# Patient Record
Sex: MALE | Race: BLACK OR AFRICAN AMERICAN | NOT HISPANIC OR LATINO | URBAN - METROPOLITAN AREA
[De-identification: names, ages, dates, MRNs, and addresses within clinical notes are randomized per-mention and may not be internally consistent; named-entity substitution may affect disease eponyms.]

---
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
## 2019-08-20 ENCOUNTER — TRANSFERRED RECORDS (OUTPATIENT)
Dept: FAMILY MEDICINE | Facility: CLINIC | Age: 19
End: 2019-08-20

## 2019-08-22 ENCOUNTER — OFFICE VISIT (OUTPATIENT)
Dept: FAMILY MEDICINE | Facility: CLINIC | Age: 19
End: 2019-08-22

## 2019-08-22 VITALS
WEIGHT: 151 LBS | RESPIRATION RATE: 16 BRPM | HEART RATE: 69 BPM | HEIGHT: 71 IN | BODY MASS INDEX: 21.14 KG/M2 | DIASTOLIC BLOOD PRESSURE: 76 MMHG | SYSTOLIC BLOOD PRESSURE: 118 MMHG | OXYGEN SATURATION: 99 %

## 2019-08-22 DIAGNOSIS — Z00.00 ROUTINE GENERAL MEDICAL EXAMINATION AT A HEALTH CARE FACILITY: Primary | ICD-10-CM

## 2019-08-22 DIAGNOSIS — Z13.6 SCREENING FOR CARDIOVASCULAR CONDITION: ICD-10-CM

## 2019-08-22 DIAGNOSIS — Z23 NEED FOR MENINGITIS VACCINATION: ICD-10-CM

## 2019-08-22 PROCEDURE — 99395 PREV VISIT EST AGE 18-39: CPT | Mod: 25 | Performed by: FAMILY MEDICINE

## 2019-08-22 PROCEDURE — 36415 COLL VENOUS BLD VENIPUNCTURE: CPT | Performed by: FAMILY MEDICINE

## 2019-08-22 PROCEDURE — 90620 MENB-4C VACCINE IM: CPT | Performed by: FAMILY MEDICINE

## 2019-08-22 PROCEDURE — 80061 LIPID PANEL: CPT | Mod: 90 | Performed by: FAMILY MEDICINE

## 2019-08-22 PROCEDURE — 90471 IMMUNIZATION ADMIN: CPT | Performed by: FAMILY MEDICINE

## 2019-08-22 PROCEDURE — 80053 COMPREHEN METABOLIC PANEL: CPT | Mod: 90 | Performed by: FAMILY MEDICINE

## 2019-08-22 ASSESSMENT — MIFFLIN-ST. JEOR: SCORE: 1717.06

## 2019-08-22 NOTE — LETTER
[facility]  [address] [place]  [address]  Phone: [phone]    August 23, 2019      Ashkan Alberts  [address]  [facility] [address]              Dear Ashkan,    The results from your recent visit showed Total and LDL cholesterol are fine.   HDL cholesterol is too low. The only thing that is known to make this better is exercise, which you are already doing.   Normal glucose, electrolytes, kidney function, and liver function.         Sincerely,     Deepa Young M.D.    Results for orders placed or performed in visit on 08/22/19   Lipid Panel ([facility])   Result Value Ref Range    Cholesterol 150 100 - 199 mg/dL    Triglycerides 56 0 - 149 mg/dL    HDL Cholesterol 39 (L) >39 mg/dL    VLDL Cholesterol Drew 11 5 - 40 mg/dL    LDL Cholesterol Calculated 100 (H) 0 - 99 mg/dL    LDL/HDL Ratio 2.6 0.0 - 3.6 ratio    Narrative    Performed at:  01 - [facility]  [employer] [place] [address]  [id number]  : Morgan Cevallos MD, Phone:  [phone]   Comp. Metabolic Panel (14) ([facility])   Result Value Ref Range    Glucose 89 65 - 99 mg/dL    Urea Nitrogen 15 6 - 20 mg/dL    Creatinine 1.04 0.76 - 1.27 mg/dL    eGFR If NonAfricn Am 103 >59 mL/min/1.73    eGFR If Africn Am 119 >59 mL/min/1.73    BUN/Creatinine Ratio 14 9 - 20    Sodium 140 134 - 144 mmol/L    Potassium 4.8 3.5 - 5.2 mmol/L    Chloride 102 96 - 106 mmol/L    Total CO2 25 20 - 29 mmol/L    Calcium 9.8 8.7 - 10.2 mg/dL    Protein Total 7.1 6.0 - 8.5 g/dL    Albumin 4.7 3.5 - 5.5 g/dL    Globulin, Total 2.4 1.5 - 4.5 g/dL    A/G Ratio 2.0 1.2 - 2.2    Bilirubin Total 0.6 0.0 - 1.2 mg/dL    Alkaline Phosphatase 79 39 - 117 IU/L    AST 15 0 - 40 IU/L    ALT 10 0 - 44 IU/L    Narrative    Performed at:  01 - [facility] [address], [place], [place]  [id number]  : Morgan Cevallos MD, Phone:  [phone]

## 2019-08-22 NOTE — PROGRESS NOTES
SUBJECTIVE:   CC: Ashkan Alberts is an 19 year old male who presents for preventive health visit.   Needs sports Px.  He takes a protein supplement for trying to add weight.  He plays Basketball for college.    Healthy Habits:    Do you get at least three servings of calcium containing foods daily (dairy, green leafy vegetables, etc.)? yes    Amount of exercise or daily activities, outside of work: 7 day(s) per week    Problems taking medications regularly No    Medication side effects: No    Have you had an eye exam in the past two years? yes    Do you see a dentist twice per year? yes    Do you have sleep apnea, excessive snoring or daytime drowsiness?no    Today's PHQ-2 Score:   PHQ-2 ( 1999 [employer]) 8/22/2019 7/25/2018   Q1: Little interest or pleasure in doing things 0 0   Q2: Feeling down, depressed or hopeless 0 0   PHQ-2 Score 0 0       Abuse: Current or Past(Physical, Sexual or Emotional)- No  Do you feel safe in your environment? Yes    Social History     Tobacco Use     Smoking status: Never Smoker     Smokeless tobacco: Never Used   Substance Use Topics     Alcohol use: No     If you drink alcohol do you typically have >3 drinks per day or >7 drinks per week? No                      Last PSA: No results found for: PSA    Reviewed orders with patient. Reviewed health maintenance and updated orders accordingly - Yes  Lab work is in process  Labs reviewed in EPIC  BP Readings from Last 3 Encounters:   08/22/19 118/76   07/25/18 98/62   08/28/17 102/70    Wt Readings from Last 3 Encounters:   08/22/19 68.5 kg (151 lb)   07/25/18 68 kg (150 lb) (45 %)*   08/28/17 68.9 kg (152 lb) (54 %)*     * Growth percentiles are based on CDC (Boys, 2-20 Years) data.                  Patient Active Problem List   Diagnosis     Attention deficit hyperactivity disorder (ADHD), predominantly inattentive type     Past Surgical History:   Procedure Laterality Date     PE TUBES Bilateral      wisdom teeth         Social  "History     Tobacco Use     Smoking status: Never Smoker     Smokeless tobacco: Never Used   Substance Use Topics     Alcohol use: No     Family History   Problem Relation Age of Onset     Hypertension Father      Hyperlipidemia Father      Cardiomyopathy Paternal Uncle 30         No current outpatient medications on file.     Allergies   Allergen Reactions     Floxin [Ofloxacin]      Recent Labs   Lab Test 06/08/17   LDL 93   HDL 50   TRIG 107        Reviewed and updated as needed this visit by clinical staff  Tobacco  Allergies  Meds  Surg Hx  Soc Hx        Reviewed and updated as needed this visit by Provider  Tobacco  Surg Hx  Soc Hx       Past Medical History:   Diagnosis Date     Hx of varicella     per [facility] - varicella vaccine not given - stated as unneccessary - verify w/pt next visit     Hx of varicella     per [facility] - varicella vaccine not give - stated unnecessary - verify w/pt next visit      Past Surgical History:   Procedure Laterality Date     PE TUBES Bilateral      wisdom teeth         ROS:  CONSTITUTIONAL: NEGATIVE for fever, chills, change in weight  INTEGUMENTARY/SKIN: NEGATIVE for worrisome rashes, moles or lesions  EYES: NEGATIVE for vision changes or irritation  ENT: NEGATIVE for ear, mouth and throat problems  RESP: NEGATIVE for significant cough or SOB  CV: NEGATIVE for chest pain, palpitations or peripheral edema  GI: NEGATIVE for nausea, abdominal pain, heartburn, or change in bowel habits   male: negative for dysuria, hematuria, decreased urinary stream, erectile dysfunction, urethral discharge  MUSCULOSKELETAL: NEGATIVE for significant arthralgias or myalgia  NEURO: NEGATIVE for weakness, dizziness or paresthesias  PSYCHIATRIC: NEGATIVE for changes in mood or affect    OBJECTIVE:   /76   Pulse 69   Resp 16   Ht 1.803 m (5' 11\")   Wt 68.5 kg (151 lb)   SpO2 99%   BMI 21.06 kg/m    EXAM:  GENERAL: healthy, alert and no distress  EYES: Eyes " "grossly normal to inspection, PERRL and conjunctivae and sclerae normal  HENT: ear canals and TM's normal, nose and mouth without ulcers or lesions  NECK: no adenopathy, no asymmetry, masses, or scars and thyroid normal to palpation  RESP: lungs clear to auscultation - no rales, rhonchi or wheezes  CV: regular rate and rhythm, normal S1 S2, no S3 or S4, no murmur, click or rub, no peripheral edema and peripheral pulses strong  ABDOMEN: soft, nontender, no hepatosplenomegaly, no masses and bowel sounds normal   (male): normal male genitalia without lesions or urethral discharge, no hernia  MS: no gross musculoskeletal defects noted, no edema  SKIN: no suspicious lesions or rashes  NEURO: Normal strength and tone, mentation intact and speech normal  PSYCH: mentation appears normal, affect normal/bright        ASSESSMENT/PLAN:   Ashkan was seen today for physical.    Diagnoses and all orders for this visit:    Routine general medical examination at a health care facility  Patient is a healthy appearing 19 year old male. Bexsero is given, all other imms are UTD, BREANA reviewed, healthy behaviors discussed.    Screening for cardiovascular condition  -     Lipid Panel ([facility])  -     Comp. Metabolic Panel (14) ([facility])    Need for meningitis vaccination  -     MEN B, IM (10 - 25 YRS) - Bexsero  -     VACCINE ADMINISTRATION, INITIAL        COUNSELING:  Reviewed preventive health counseling, as reflected in patient instructions       Regular exercise       Healthy diet/nutrition       Vision screening       Hearing screening       Immunizations    Vaccinated for: Bexsero             Safe sex practices/STD prevention       HIV screeninx in teen years, 1x in adult years, and at intervals if high risk: offered to patient but declined.    Estimated body mass index is 21.06 kg/m  as calculated from the following:    Height as of this encounter: 1.803 m (5' 11\").    Weight as of this encounter: 68.5 kg (151 lb).         " reports that he has never smoked. He has never used smokeless tobacco.      Counseling Resources:  ATP IV Guidelines  Pooled Cohorts Equation Calculator  FRAX Risk Assessment  ICSI Preventive Guidelines  Dietary Guidelines for Americans, 2010  USDA's MyPlate  ASA Prophylaxis  Lung CA Screening    Deepa Young MD  [facility]

## 2019-08-23 LAB
ALBUMIN SERPL-MCNC: 4.7 G/DL (ref 3.5–5.5)
ALBUMIN/GLOB SERPL: 2 {RATIO} (ref 1.2–2.2)
ALP SERPL-CCNC: 79 IU/L (ref 39–117)
ALT SERPL-CCNC: 10 IU/L (ref 0–44)
AST SERPL-CCNC: 15 IU/L (ref 0–40)
BILIRUB SERPL-MCNC: 0.6 MG/DL (ref 0–1.2)
BUN SERPL-MCNC: 15 MG/DL (ref 6–20)
BUN/CREATININE RATIO: 14 (ref 9–20)
CALCIUM SERPL-MCNC: 9.8 MG/DL (ref 8.7–10.2)
CHLORIDE SERPLBLD-SCNC: 102 MMOL/L (ref 96–106)
CHOLEST SERPL-MCNC: 150 MG/DL (ref 100–199)
CREAT SERPL-MCNC: 1.04 MG/DL (ref 0.76–1.27)
EGFR IF AFRICN AM: 119 ML/MIN/1.73
EGFR IF NONAFRICN AM: 103 ML/MIN/1.73
GLOBULIN, TOTAL: 2.4 G/DL (ref 1.5–4.5)
GLUCOSE SERPL-MCNC: 89 MG/DL (ref 65–99)
HDLC SERPL-MCNC: 39 MG/DL
LDL/HDL RATIO: 2.6 RATIO (ref 0–3.6)
LDLC SERPL CALC-MCNC: 100 MG/DL (ref 0–99)
POTASSIUM SERPL-SCNC: 4.8 MMOL/L (ref 3.5–5.2)
PROT SERPL-MCNC: 7.1 G/DL (ref 6–8.5)
SODIUM SERPL-SCNC: 140 MMOL/L (ref 134–144)
TOTAL CO2: 25 MMOL/L (ref 20–29)
TRIGL SERPL-MCNC: 56 MG/DL (ref 0–149)
VLDLC SERPL CALC-MCNC: 11 MG/DL (ref 5–40)

## 2020-04-03 ENCOUNTER — OFFICE VISIT (OUTPATIENT)
Dept: FAMILY MEDICINE | Facility: CLINIC | Age: 20
End: 2020-04-03

## 2020-04-03 VITALS
WEIGHT: 155.8 LBS | SYSTOLIC BLOOD PRESSURE: 120 MMHG | TEMPERATURE: 98 F | RESPIRATION RATE: 16 BRPM | BODY MASS INDEX: 21.81 KG/M2 | HEIGHT: 71 IN | DIASTOLIC BLOOD PRESSURE: 76 MMHG

## 2020-04-03 DIAGNOSIS — M25.552 HIP PAIN, BILATERAL: ICD-10-CM

## 2020-04-03 DIAGNOSIS — M25.551 HIP PAIN, BILATERAL: ICD-10-CM

## 2020-04-03 DIAGNOSIS — Z01.818 PREOP GENERAL PHYSICAL EXAM: Primary | ICD-10-CM

## 2020-04-03 PROCEDURE — 99214 OFFICE O/P EST MOD 30 MIN: CPT | Performed by: FAMILY MEDICINE

## 2020-04-03 ASSESSMENT — MIFFLIN-ST. JEOR: SCORE: 1738.83

## 2020-04-03 NOTE — PROGRESS NOTES
[facility]  [address] [place] [id number] [phone]  Dept: [phone]    PRE-OP EVALUATION:  Today's date: 4/3/2020    Ashkan Alberts (: 2000) presents for pre-operative evaluation assessment as requested by Dr. Christiano Nunez.  He requires evaluation and anesthesia risk assessment prior to undergoing surgery/procedure for treatment of hip pain bilateral .    Proposed Surgery/ Procedure: Left arthroscopic hip surgery, Right arthroscopic hip surgery (patient reports impingement syndrome). No records available to review  Date of Surgery/ Procedure: 2020 & 2020  Time of Surgery/ Procedure: [facility]  Hospital/Surgical Facility: [facility]  Fax number for surgical facility: [phone]  Primary Physician: Musa Baldwin  Type of Anesthesia Anticipated: to be determined    Patient has a Health Care Directive or Living Will:  YES     1. NO - Do you have a history of heart attack, stroke, stent, bypass or surgery on an artery in the head, neck, heart or legs?  2. NO - Do you ever have any pain or discomfort in your chest?  3. NO - Do you have a history of  Heart Failure?  4. NO - Are you troubled by shortness of breath when: walking on the level, up a slight hill or at night?  5. NO - Do you currently have a cold, bronchitis or other respiratory infection?  6. NO - Do you have a cough, shortness of breath or wheezing?  7. NO - Do you sometimes get pains in the calves of your legs when you walk?  8. NO - Do you or anyone in your family have previous history of blood clots?  9. NO - Do you or does anyone in your family have a serious bleeding problem such as prolonged bleeding following surgeries or cuts?  10. NO - Have you ever had problems with anemia or been told to take iron pills?  11. NO - Have you had any abnormal blood loss such as black, tarry or bloody stools, or abnormal vaginal bleeding?  12. NO - Have you ever had a blood  "transfusion?  13. NO - Have you or any of your relatives ever had problems with anesthesia?  14. NO - Do you have sleep apnea, excessive snoring or daytime drowsiness?  15. NO - Do you have any prosthetic heart valves?  16. NO - Do you have prosthetic joints?      HPI:     HPI related to upcoming procedure: Bilateral hip pain for several months, progressively worsening.  Plays college basketball.  Dr Nunez to do the surgery.      See problem list for active medical problems.  Problems all longstanding and stable, except as noted/documented.  See ROS for pertinent symptoms related to these conditions.      MEDICAL HISTORY:     Patient Active Problem List    Diagnosis Date Noted     Attention deficit hyperactivity disorder (ADHD), predominantly inattentive type 08/28/2017     Priority: Medium      Past Medical History:   Diagnosis Date     Hx of varicella     per [facility] - varicella vaccine not given - stated as unneccessary - verify w/pt next visit     Hx of varicella     per [facility] - varicella vaccine not give - stated unnecessary - verify w/pt next visit     Past Surgical History:   Procedure Laterality Date     PE TUBES Bilateral      wisdom teeth       No current outpatient medications on file.     OTC products: no recent use of OTC ASA, NSAIDS or Steroids and no use of herbal medications or other supplements    Allergies   Allergen Reactions     Floxin [Ofloxacin]       Latex Allergy: NO    Social History     Tobacco Use     Smoking status: Never Smoker     Smokeless tobacco: Never Used   Substance Use Topics     Alcohol use: No     History   Drug Use Unknown       REVIEW OF SYSTEMS:   Constitutional, neuro, ENT, endocrine, pulmonary, cardiac, gastrointestinal, genitourinary, musculoskeletal, integument and psychiatric systems are negative, except as otherwise noted.    EXAM:   /76   Temp 98  F (36.7  C) (Oral)   Resp 16   Ht 1.803 m (5' 11\")   Wt 70.7 kg (155 lb 12.8 oz)   BMI " 21.73 kg/m      General: well appearing, no acute distress  Ears: canals clear.  Tympanic membranes clear bilaterally  Nares: clear  Oropharynx: clear  Neck: supple. No lymphadenopathy. No thyromegaly  Heart: RRR, no murmur  Chest: Lungs clear to ausculation bilaterally  Abdomen: soft, nontender, nondistended.  NABS. No HSM.  No masses  Extremities: no edema.  DP and PT pulses 2+ and symmetric  Skin: clear  Psych: normal mood and affect      DIAGNOSTICS:   No labs or EKG required    IMPRESSION:   Reason for surgery/procedure: Bilateral hip pain  Diagnosis/reason for consult: Preoperative risk assessment prior to bilateral arthroscopic hip surgery    The proposed surgical procedure is considered INTERMEDIATE risk.    REVISED CARDIAC RISK INDEX  The patient has the following serious cardiovascular risks for perioperative complications such as (MI, PE, VFib and 3  AV Block):  No serious cardiac risks  INTERPRETATION: 0 risks: Class I (very low risk - 0.4% complication rate)    The patient has the following additional risks for perioperative complications:  No identified additional risks      ICD-10-CM    1. Preop general physical exam  Z01.818        RECOMMENDATIONS:     --no anticipated risks to upcoming surgery    --Patient is on no chronic medications    There are no apparent contraindications and the patient is medically appropriate to proceed with planned surgery       Signed Electronically by: Musa Baldwin MD    Copy of this evaluation report is provided to requesting physician.    [facility] Preop Guidelines    Revised Cardiac Risk Index

## 2020-04-07 NOTE — PROGRESS NOTES
4/3/20 faxed preop to [facility] @ [phone]    Rohan Finley,   [facility]  [phone]

## 2020-05-22 NOTE — PROGRESS NOTES
5/20/20 faxed preop to [facility] surgery @ [phone], with a note stating provider did not approve for upcoming preop.  This was over 30 days old.    Rohan Finley,   [facility]  [phone]

## 2021-04-26 ENCOUNTER — IMMUNIZATION (OUTPATIENT)
Dept: NURSING | Facility: CLINIC | Age: 21
End: 2021-04-26
Payer: COMMERCIAL

## 2021-04-26 PROCEDURE — 0001A PR COVID VAC PFIZER DIL RECON 30 MCG/0.3 ML IM: CPT

## 2021-04-26 PROCEDURE — 91300 PR COVID VAC PFIZER DIL RECON 30 MCG/0.3 ML IM: CPT

## 2021-05-16 ENCOUNTER — HEALTH MAINTENANCE LETTER (OUTPATIENT)
Age: 21
End: 2021-05-16

## 2021-05-17 ENCOUNTER — IMMUNIZATION (OUTPATIENT)
Dept: NURSING | Facility: CLINIC | Age: 21
End: 2021-05-17
Attending: INTERNAL MEDICINE
Payer: COMMERCIAL

## 2021-05-17 PROCEDURE — 0002A PR COVID VAC PFIZER DIL RECON 30 MCG/0.3 ML IM: CPT

## 2021-05-17 PROCEDURE — 91300 PR COVID VAC PFIZER DIL RECON 30 MCG/0.3 ML IM: CPT

## 2021-09-05 ENCOUNTER — HEALTH MAINTENANCE LETTER (OUTPATIENT)
Age: 21
End: 2021-09-05

## 2022-06-12 ENCOUNTER — HEALTH MAINTENANCE LETTER (OUTPATIENT)
Age: 22
End: 2022-06-12

## 2022-10-23 ENCOUNTER — HEALTH MAINTENANCE LETTER (OUTPATIENT)
Age: 22
End: 2022-10-23

## 2023-06-24 ENCOUNTER — HEALTH MAINTENANCE LETTER (OUTPATIENT)
Age: 23
End: 2023-06-24